# Patient Record
Sex: FEMALE | Race: ASIAN | NOT HISPANIC OR LATINO
[De-identification: names, ages, dates, MRNs, and addresses within clinical notes are randomized per-mention and may not be internally consistent; named-entity substitution may affect disease eponyms.]

---

## 2022-06-27 ENCOUNTER — APPOINTMENT (OUTPATIENT)
Dept: CARDIOLOGY | Facility: CLINIC | Age: 57
End: 2022-06-27
Payer: MEDICAID

## 2022-06-27 VITALS
DIASTOLIC BLOOD PRESSURE: 83 MMHG | OXYGEN SATURATION: 99 % | HEART RATE: 65 BPM | SYSTOLIC BLOOD PRESSURE: 145 MMHG | WEIGHT: 147 LBS | TEMPERATURE: 98.3 F | RESPIRATION RATE: 18 BRPM | HEIGHT: 62.2 IN | BODY MASS INDEX: 26.71 KG/M2

## 2022-06-27 DIAGNOSIS — I10 ESSENTIAL (PRIMARY) HYPERTENSION: ICD-10-CM

## 2022-06-27 DIAGNOSIS — Z80.0 FAMILY HISTORY OF MALIGNANT NEOPLASM OF DIGESTIVE ORGANS: ICD-10-CM

## 2022-06-27 DIAGNOSIS — R07.89 OTHER CHEST PAIN: ICD-10-CM

## 2022-06-27 DIAGNOSIS — R06.02 SHORTNESS OF BREATH: ICD-10-CM

## 2022-06-27 PROBLEM — Z00.00 ENCOUNTER FOR PREVENTIVE HEALTH EXAMINATION: Status: ACTIVE | Noted: 2022-06-27

## 2022-06-27 PROCEDURE — 99204 OFFICE O/P NEW MOD 45 MIN: CPT

## 2022-06-28 PROBLEM — I10 HTN (HYPERTENSION): Status: ACTIVE | Noted: 2022-06-28

## 2022-06-28 PROBLEM — Z80.0 FAMILY HISTORY OF LIVER CANCER: Status: ACTIVE | Noted: 2022-06-28

## 2022-06-28 PROBLEM — R06.02 SOB (SHORTNESS OF BREATH): Status: ACTIVE | Noted: 2022-06-28

## 2022-06-28 PROBLEM — R07.89 CHEST DISCOMFORT: Status: ACTIVE | Noted: 2022-06-28

## 2022-06-28 NOTE — HISTORY OF PRESENT ILLNESS
[FreeTextEntry1] : 57 year-old female with HTN, HLD, Anemia presents for evaluation of dizziness. Patient reports that she had been feeling dizzy for the last 6 months lasting seconds. Patient reports CP and SOB with exertion. Patient denies palpitations. Patient denies h/o syncope. She did report an episode of fall not related to dizziness. I advised patient to wear a Holter monitor. I advised patient to undergo an echocardiogram and a treadmill stress test when she returns.

## 2022-06-30 ENCOUNTER — NON-APPOINTMENT (OUTPATIENT)
Age: 57
End: 2022-06-30

## 2022-10-04 ENCOUNTER — NON-APPOINTMENT (OUTPATIENT)
Age: 57
End: 2022-10-04